# Patient Record
Sex: FEMALE | Race: WHITE
[De-identification: names, ages, dates, MRNs, and addresses within clinical notes are randomized per-mention and may not be internally consistent; named-entity substitution may affect disease eponyms.]

---

## 2018-07-20 ENCOUNTER — HOSPITAL ENCOUNTER (OUTPATIENT)
Dept: HOSPITAL 92 - BICRAD | Age: 33
Discharge: HOME | End: 2018-07-20
Attending: FAMILY MEDICINE
Payer: COMMERCIAL

## 2018-07-20 DIAGNOSIS — S89.91XA: Primary | ICD-10-CM

## 2018-07-20 DIAGNOSIS — M81.0: ICD-10-CM

## 2018-12-05 ENCOUNTER — HOSPITAL ENCOUNTER (OUTPATIENT)
Dept: HOSPITAL 92 - RAD | Age: 33
Discharge: HOME | End: 2018-12-05
Payer: COMMERCIAL

## 2018-12-05 DIAGNOSIS — M89.9: ICD-10-CM

## 2018-12-05 DIAGNOSIS — M79.631: ICD-10-CM

## 2018-12-05 DIAGNOSIS — S80.912A: Primary | ICD-10-CM

## 2018-12-05 NOTE — RAD
TWO VIEWS RIGHT FOREARM:

 

Comparison: None. 

 

History: Injury, pain. 

 

FINDINGS: 

Two views of the right forearm shows no evidence of acute fracture or dislocation. No soft tissue swe
lling is seen. No degenerative changes are seen. 

 

IMPRESSION: 

No evidence of acute osseous abnormality. 

 

POS: [facility]

## 2018-12-05 NOTE — RAD
RADIOGRAPH RIGHT KNEE 4 VIEWS:

12/5/18

 

HISTORY: 

33-year-old female with right knee pain.

 

COMPARISON:  

None available.

 

FINDINGS:  

There is heterogeneous attenuation of the proximal tibial metadiaphysis, with multiple ill-defined, i
rregularly shaped lucencies surrounded by more dense bone. There is questionable similar such, more s
ubtle finding involving the proximal fibular metadiaphysis. 

 

A few centimeters more superiorly at the medial posterior aspect of the proximal tibial metaphysis, t
here is a heterogeneously mildly sclerotic lesion measuring approximately 2.5 x 1 x 1.5 cm, with scle
rotic rim. It  broadly abuts the posterior and medial osseous cortical surface without cortical break
 through. 

 

The medial, lateral, and patellofemoral compartment joint spaces are maintained without erosions or o
steophytes. No definite joint effusion. No fracture or dislocation. 

 

IMPRESSION:  

1.      Permeative pattern in the proximal tibial metadiaphysis. Although osteomyelitis and aggressiv
e bone neoplasms can have this appearance, the fact that it is stable in appearance since 7/20/18 sug
gests a more benign etiology, such as severe osteoporosis (which would still be abnormal for this age
 group).

2.      Sclerotic lesion in the proximal tibial metaphysis has a benign appearance, perhaps a sclerot
ic fibroxanthoma.

3.      No degenerative changes of the knee.

4.      No interval change overall since 7/20/18. 

5.      Recommend dedicated right leg tibia and fibula radiograph for further evaluation. 

 

POS: [facility]

## 2018-12-05 NOTE — RAD
RADIOGRAPH RIGHT ELBOW 4 VIEWS:

12/5/18

 

HISTORY: 

33-year-old female status post acute traumatic injury to the elbow. 

 

FINDINGS:  

No evidence of distention of the joint capsule. No fracture, dislocation, or degenerative changes. No
 radiopaque foreign body or soft tissue calcifications. Joint spaces are maintained without erosions 
or osteophytes. 

 

IMPRESSION:  

Normal. 

 

POS: [facility]